# Patient Record
Sex: MALE | Race: WHITE
[De-identification: names, ages, dates, MRNs, and addresses within clinical notes are randomized per-mention and may not be internally consistent; named-entity substitution may affect disease eponyms.]

---

## 2019-02-13 ENCOUNTER — HOSPITAL ENCOUNTER (EMERGENCY)
Dept: HOSPITAL 31 - C.ER | Age: 24
Discharge: HOME | End: 2019-02-13
Payer: COMMERCIAL

## 2019-02-13 VITALS
RESPIRATION RATE: 18 BRPM | OXYGEN SATURATION: 99 % | SYSTOLIC BLOOD PRESSURE: 135 MMHG | DIASTOLIC BLOOD PRESSURE: 84 MMHG | HEART RATE: 89 BPM | TEMPERATURE: 99 F

## 2019-02-13 DIAGNOSIS — R36.9: Primary | ICD-10-CM

## 2019-02-13 LAB
BILIRUB UR-MCNC: NEGATIVE MG/DL
GLUCOSE UR STRIP-MCNC: NORMAL MG/DL
LEUKOCYTE ESTERASE UR-ACNC: (no result) LEU/UL
PH UR STRIP: 5 [PH] (ref 5–8)
PROT UR STRIP-MCNC: NEGATIVE MG/DL
RBC # UR STRIP: NEGATIVE /UL
SP GR UR STRIP: 1.02 (ref 1–1.03)
SQUAMOUS EPITHIAL: 1 /HPF (ref 0–5)
UROBILINOGEN UR-MCNC: NORMAL MG/DL (ref 0.2–1)

## 2019-02-13 NOTE — C.PDOC
History Of Present Illness


23 y/o male presents to the ED complaining of recurrent penile discharge for 2 

months. Symptoms present only with urination. States he sometimes sees it 

staining his underwear, more prevalent in the morning. He also reports 

subjective fever but denies any abdominal pain, nausea, or vomiting. Patient 

states he had similar symptoms last summer, s/p outpatient treatment, and 

tested negative for STD. He denies high risk sexual activity, reports 

unprotected sex with 1 monogamous partner. Denies concern for STD. Denies any 

penile lesions or testicular pain or swelling.





Time Seen by Provider: 02/13/19 10:14


Chief Complaint (Nursing): Male Genitourinary


History Per: Patient


History/Exam Limitations: no limitations


Onset/Duration Of Symptoms: Days


Current Symptoms Are (Timing): Still Present





Past Medical History


Reviewed: Historical Data, Nursing Documentation, Vital Signs


Vital Signs: 





                                Last Vital Signs











Temp  99 F   02/13/19 10:06


 


Pulse  89   02/13/19 10:06


 


Resp  18   02/13/19 10:06


 


BP  135/84   02/13/19 10:06


 


Pulse Ox  99   02/13/19 10:06














- Medical History


PMH: No Chronic Diseases


Surgical History: No Surg Hx


Family History: States: Unknown Family Hx





- Social History


Hx Tobacco Use: Yes


Hx Alcohol Use: No


Hx Substance Use: Yes





- Immunization History


Hx Tetanus Toxoid Vaccination: No


Hx Influenza Vaccination: No


Hx Pneumococcal Vaccination: No





Review Of Systems


Except As Marked, All Systems Reviewed And Found Negative.


Constitutional: Negative for: Fever, Chills


Gastrointestinal: Negative for: Nausea, Vomiting, Abdominal Pain


Genitourinary: Positive for: Penile Discharge.  Negative for: Dysuria, 

Frequency, Rash, Penile Pain


Neurological: Negative for: Weakness, Numbness





Physical Exam





- Physical Exam


Appears: Non-toxic, No Acute Distress


Skin: Warm, Dry, No Rash


Head: Atraumatic, Normacephalic


Eye(s): bilateral: Normal Inspection, PERRL, EOMI


Neck: Normal ROM


Chest: Symmetrical


Respiratory: No Accessory Muscle Use, Other (NARD)


Gastrointestinal/Abdominal: Soft, No Tenderness, No Distention


Male Genital: Normal Inspection (with no penile lesions), No Testicular 

Tenderness, No Testicular Swelling, No Circumcised


Extremity: Bilateral: Atraumatic, Normal ROM


Pulses: Left Dorsalis Pedis: Normal, Right Dorsalis Pedis: Normal


Neurological/Psych: Oriented x3


Gait: Steady





ED Course And Treatment


O2 Sat by Pulse Oximetry: 99 (RA)


Pulse Ox Interpretation: Normal


Reevaluation Time: 11:14


Reassessment Condition: Unchanged (PT DOES NOT WISH STD TX @ THIS TIME. WILL FU 

)





Medical Decision Making


Medical Decision Making: 





Impression: Penile discharge with urination





Initial Plan:


- Urinalysis


- Urine culture


- Chlamydia/GC swab sent











Disposition


Counseled Patient/Family Regarding: Diagnosis, Need For Followup





- Disposition


Referrals: 


[facility] [Outside]


[facility] [Outside]


Iggy German Jr., MD [Staff Provider] - 


Disposition: HOME/ ROUTINE


Disposition Time: 11:14


Condition: GOOD


Additional Instructions: 


FOLLOW UP CLINIC FOR UROLOGY.


Forms:  [facility] (English), General Discharge Instructions





- Clinical Impression


Clinical Impression: 


 Penile discharge








- Scribe Statement


The provider has reviewed the documentation as recorded by the Larryibjessica Nieto





Provider Attestation: 


All medical record entries made by the Scribe were at my direction and 

personally dictated by me. I have reviewed the chart and agree that the record 

accurately reflects my personal performance of the history, physical exam, 

medical decision making, and the department course for this patient. I have also

personally directed, reviewed, and agree with the discharge instructions and 

disposition.